# Patient Record
(demographics unavailable — no encounter records)

---

## 2025-06-04 NOTE — HISTORY OF PRESENT ILLNESS
[de-identified] : 35-year-old female presenting for upper respiratory symptoms.  Her toddler daughter came home with a cold and she has had a cough with significant phlegm.  She is also breast-feeding her 6-week-old baby who is now also sick.

## 2025-06-04 NOTE — PHYSICAL EXAM
[Normal Outer Ear/Nose] : the outer ears and nose were normal in appearance [Normal Gait] : normal gait [Normal] : affect was normal and insight and judgment were intact [de-identified] : congestion

## 2025-07-21 NOTE — HEALTH RISK ASSESSMENT
[Good] : ~his/her~  mood as  good [1 or 2 (0 pts)] : 1 or 2 (0 points) [Never (0 pts)] : Never (0 points) [No] : In the past 12 months have you used drugs other than those required for medical reasons? No [No falls in past year] : Patient reported no falls in the past year [0] : 2) Feeling down, depressed, or hopeless: Not at all (0) [PHQ-2 Negative - No further assessment needed] : PHQ-2 Negative - No further assessment needed [Never] : Never [Patient reported PAP Smear was normal] : Patient reported PAP Smear was normal [With Family] : lives with family [Unemployed] : unemployed [] :  [# Of Children ___] : has [unfilled] children [Feels Safe at Home] : Feels safe at home [Reports changes in dental health] : Reports changes in dental health [Smoke Detector] : smoke detector [Carbon Monoxide Detector] : carbon monoxide detector [Seat Belt] :  uses seat belt [Audit-CScore] : 0 [[id number]] : 0 [Change in mental status noted] : No change in mental status noted [Reports changes in hearing] : Reports no changes in hearing [Reports changes in vision] : Reports no changes in vision [PapSmearDate] : 06/25

## 2025-07-21 NOTE — HISTORY OF PRESENT ILLNESS
[de-identified] : 35-year-old female presenting for annual wellness visit She is 3 months postpartum and is feeling okay.  She is breast-feeding her son Her only concern is significant family history of colon cancer including her first cousin that passed away in his early 40s and an uncle that has been successfully treated for colon cancer.  She has had some rectal bleeding and states that she has a troublesome hemorrhoid  [id number] Esme and 3-month-old Ricky Lives at home with her  and 2 children and feels safe at home.  She is a stay-at-home mom.   is her only intimate partner

## 2025-07-21 NOTE — HEALTH RISK ASSESSMENT
[Good] : ~his/her~  mood as  good [1 or 2 (0 pts)] : 1 or 2 (0 points) [Never (0 pts)] : Never (0 points) [No] : In the past 12 months have you used drugs other than those required for medical reasons? No [No falls in past year] : Patient reported no falls in the past year [0] : 2) Feeling down, depressed, or hopeless: Not at all (0) [PHQ-2 Negative - No further assessment needed] : PHQ-2 Negative - No further assessment needed [Never] : Never [Patient reported PAP Smear was normal] : Patient reported PAP Smear was normal [With Family] : lives with family [Unemployed] : unemployed [] :  [# Of Children ___] : has [unfilled] children [Feels Safe at Home] : Feels safe at home [Reports changes in dental health] : Reports changes in dental health [Smoke Detector] : smoke detector [Carbon Monoxide Detector] : carbon monoxide detector [Seat Belt] :  uses seat belt [Audit-CScore] : 0 [[id number]] : 0 [Change in mental status noted] : No change in mental status noted [Reports changes in hearing] : Reports no changes in hearing [Reports changes in vision] : Reports no changes in vision [PapSmearDate] : 06/25

## 2025-07-21 NOTE — HISTORY OF PRESENT ILLNESS
[de-identified] : 35-year-old female presenting for annual wellness visit She is 3 months postpartum and is feeling okay.  She is breast-feeding her son Her only concern is significant family history of colon cancer including her first cousin that passed away in his early 40s and an uncle that has been successfully treated for colon cancer.  She has had some rectal bleeding and states that she has a troublesome hemorrhoid  [id number] Esme and 3-month-old Ricky Lives at home with her  and 2 children and feels safe at home.  She is a stay-at-home mom.   is her only intimate partner